# Patient Record
Sex: FEMALE | Race: BLACK OR AFRICAN AMERICAN | Employment: FULL TIME | URBAN - METROPOLITAN AREA
[De-identification: names, ages, dates, MRNs, and addresses within clinical notes are randomized per-mention and may not be internally consistent; named-entity substitution may affect disease eponyms.]

---

## 2017-01-03 ENCOUNTER — ED HISTORICAL/CONVERTED ENCOUNTER (OUTPATIENT)
Dept: OTHER | Age: 22
End: 2017-01-03

## 2017-02-09 ENCOUNTER — APPOINTMENT (OUTPATIENT)
Dept: GENERAL RADIOLOGY | Age: 22
End: 2017-02-09
Attending: EMERGENCY MEDICINE
Payer: COMMERCIAL

## 2017-02-09 LAB — HCG UR QL: NEGATIVE

## 2017-02-09 PROCEDURE — 81025 URINE PREGNANCY TEST: CPT

## 2017-02-09 PROCEDURE — 99283 EMERGENCY DEPT VISIT LOW MDM: CPT

## 2017-02-09 PROCEDURE — 73130 X-RAY EXAM OF HAND: CPT

## 2017-02-09 PROCEDURE — 75810000053 HC SPLINT APPLICATION

## 2017-02-10 ENCOUNTER — HOSPITAL ENCOUNTER (EMERGENCY)
Age: 22
Discharge: HOME | End: 2017-02-10
Attending: STUDENT IN AN ORGANIZED HEALTH CARE EDUCATION/TRAINING PROGRAM | Admitting: STUDENT IN AN ORGANIZED HEALTH CARE EDUCATION/TRAINING PROGRAM
Payer: COMMERCIAL

## 2017-02-10 VITALS
RESPIRATION RATE: 18 BRPM | HEIGHT: 64 IN | OXYGEN SATURATION: 100 % | TEMPERATURE: 98 F | WEIGHT: 187.4 LBS | DIASTOLIC BLOOD PRESSURE: 81 MMHG | SYSTOLIC BLOOD PRESSURE: 118 MMHG | BODY MASS INDEX: 31.99 KG/M2 | HEART RATE: 77 BPM

## 2017-02-10 DIAGNOSIS — S60.221A CONTUSION OF RIGHT HAND, INITIAL ENCOUNTER: Primary | ICD-10-CM

## 2017-02-10 DIAGNOSIS — M79.89 SWELLING OF RIGHT HAND: ICD-10-CM
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
## 2017-02-10 NOTE — LETTER
[address] 
[address] [facility] DEPT 
[address] [place], [place] [address] [id number] 
[phone] Work/School Note Date: 2/9/2017 To Whom It May concern: 
 
Jarett Marina was seen and treated today in the emergency room by the following provider(s): 
Attending Provider: Consuelo Lopez MD. Michael Van was in ED with Tony Garzon. Please excuse from work 2/10/17.  
 
Sincerely, 
 
 
 
 
Laurie Mcclain RN

## 2017-02-10 NOTE — LETTER
[address] 
[address] [facility] DEPT 
[address] [place] [address] [address] [id number] 
[phone] Work/School Note Date: 2/9/2017 To Whom It May concern: 
 
Marcos Piña was seen and treated today in the emergency room by the following provider(s): 
Attending Provider: Frandy Thompson MD. Please excuse from work 2/10/17.  
 
Sincerely, 
 
 
 
 
Carlin Azevedo RN
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
## 2017-02-10 NOTE — DISCHARGE INSTRUCTIONS
Wear the splint for the next 2-3 days while the swelling in your hand improves.   If you do not have improvement in you symptoms please see an orthopedic specialist.